# Patient Record
Sex: MALE | Race: WHITE | NOT HISPANIC OR LATINO | Employment: FULL TIME | URBAN - METROPOLITAN AREA
[De-identification: names, ages, dates, MRNs, and addresses within clinical notes are randomized per-mention and may not be internally consistent; named-entity substitution may affect disease eponyms.]

---

## 2021-12-02 ENCOUNTER — TELEPHONE (OUTPATIENT)
Dept: ORTHOPEDIC SURGERY | Facility: CLINIC | Age: 31
End: 2021-12-02

## 2021-12-07 ENCOUNTER — OFFICE VISIT (OUTPATIENT)
Dept: ORTHOPEDIC SURGERY | Facility: CLINIC | Age: 31
End: 2021-12-07

## 2021-12-07 VITALS — BODY MASS INDEX: 26.6 KG/M2 | HEART RATE: 76 BPM | OXYGEN SATURATION: 100 % | WEIGHT: 190 LBS | HEIGHT: 71 IN

## 2021-12-07 DIAGNOSIS — S62.665A CLOSED NONDISPLACED FRACTURE OF DISTAL PHALANX OF LEFT RING FINGER, INITIAL ENCOUNTER: ICD-10-CM

## 2021-12-07 DIAGNOSIS — S62.633A CLOSED DISPLACED FRACTURE OF DISTAL PHALANX OF LEFT MIDDLE FINGER, INITIAL ENCOUNTER: ICD-10-CM

## 2021-12-07 DIAGNOSIS — M79.642 LEFT HAND PAIN: Primary | ICD-10-CM

## 2021-12-07 PROCEDURE — 29130 APPL FINGER SPLINT STATIC: CPT | Performed by: PHYSICIAN ASSISTANT

## 2021-12-07 PROCEDURE — 99203 OFFICE O/P NEW LOW 30 MIN: CPT | Performed by: PHYSICIAN ASSISTANT

## 2021-12-07 NOTE — PROGRESS NOTES
"Chief Complaint  Initial Evaluation of the Left Hand    Subjective          Nicolas Powell presents to [facility] for evaluation of left hand pain.  Patient states he sustained injury while at work 2 weeks ago (11-19-21).  Patient works as a  in [employer].  He states that he had his left second through fourth digits jammed in a garbage bin.  Patient went to [facility] and had imaging obtained showing acute fractures of third and fourth distal phalanges.  Patient sustained lacerations of the second through fourth digits that had sutures.  He had suture removal on 12/05/2021 at urgent care.  He states he was using a splint for his fingers, but the splints were long and causing stiffness of the entire fingers.  Patient states he still has swelling and pain, worse of the third digit.    Objective   No Known Allergies    Vital Signs:   Pulse 76   Ht 180.3 cm (71\")   Wt 86.2 kg (190 lb)   SpO2 100%   BMI 26.50 kg/m²       Physical Exam  Constitutional:       Appearance: Normal appearance. Patient is well-developed and normal weight.   HENT:      Head: Normocephalic.      Right Ear: Hearing and external ear normal.      Left Ear: Hearing and external ear normal.      Nose: Nose normal.   Eyes:      Conjunctiva/sclera: Conjunctivae normal.   Cardiovascular:      Rate and Rhythm: Normal rate.   Pulmonary:      Effort: Pulmonary effort is normal.      Breath sounds: No wheezing or rales.   Abdominal:      Palpations: Abdomen is soft.      Tenderness: There is no abdominal tenderness.   Musculoskeletal:      Cervical back: Normal range of motion.   Skin:     Findings: No rash.   Neurological:      Mental Status: Patient is alert and oriented to person, place, and time.   Psychiatric:         Mood and Affect: Mood and affect normal.         Judgment: Judgment normal.     Ortho Exam  Left hand: Well-healed lacerations along the second and fourth digits.  " Subungual hematoma of the second through fourth digits.  No signs of infection.  No heat, redness or deformity.  Mild swelling along the third through fourth digit.  Tenderness to palpate the DIP of third through fourth digit.  Full range of motion of the digits.  FDS and FDP intact.  Normal thumb opposition and abduction.  Sensation intact, neurovascular tact, radial and ulnar pulse are 2+.    Result Review :   The following data was reviewed by: LINDSAY Henry on 12/07/2021:         Imaging Results (Most Recent)     Procedure Component Value Units Date/Time    XR Hand 2 View Left [[id number]] Resulted: 12/07/21 0951     Updated: 12/07/21 0952    Narrative:      X-Ray Report:  Study: X-rays ordered, taken in the office, and reviewed today  Site: Left hand xray  Indication: Left hand pain  View: AP and Lateral view(s)  Findings: Mildly displaced distal phalanx fracture third digit.    Nondisplaced fracture of the fourth distal phalanx.  Prior studies available for comparison: no       Impression:               Orthopedic Injury Treatment    Date/Time: 12/7/2021 9:24 AM  Performed by: Loree Perez PA  Authorized by: Loree Perez PA   Injury location: finger    Anesthesia:  Local anesthesia used: no    Sedation:  Patient sedated: no    Immobilization: splint  Splint type: dynamic finger  Post-procedure neurovascular assessment: post-procedure neurovascularly intact  Patient tolerance: patient tolerated the procedure well with no immediate complications            Assessment and Plan    Problem List Items Addressed This Visit        Musculoskeletal and Injuries    Closed nondisplaced fracture of distal phalanx of left ring finger    Relevant Orders    Orthopedic Injury Treatment    Closed displaced fracture of distal phalanx of left middle finger    Relevant Orders    Orthopedic Injury Treatment      Other Visit Diagnoses     Left hand pain    -  Primary    Relevant Orders    XR Hand 2 View Left         Patient was placed into DIP splint of the third digit with cooper loop to prevent PIP stiffness that he was experiencing with previous plan.  He is instructed to leave splint on until follow-up visit in 3 weeks at which time we will repeat the films and ensure proper healing.      Follow Up   Return in about 3 weeks (around 12/28/2021).  Patient Instructions   Patient placed in finger splint today with cooper loop.   No heavy lifting, pushing or pulling.     Follow up in 3 weeks. Repeat films.     Patient was given instructions and counseling regarding his condition or for health maintenance advice. Please see specific information pulled into the AVS if appropriate.

## 2021-12-07 NOTE — PATIENT INSTRUCTIONS
Patient placed in finger splint today with cooper loop.   No heavy lifting, pushing or pulling.     Follow up in 3 weeks. Repeat films.

## 2021-12-28 ENCOUNTER — OFFICE VISIT (OUTPATIENT)
Dept: ORTHOPEDIC SURGERY | Facility: CLINIC | Age: 31
End: 2021-12-28

## 2021-12-28 VITALS — BODY MASS INDEX: 26.96 KG/M2 | OXYGEN SATURATION: 98 % | WEIGHT: 192.6 LBS | HEART RATE: 89 BPM | HEIGHT: 71 IN

## 2021-12-28 DIAGNOSIS — S62.665D CLOSED NONDISPLACED FRACTURE OF DISTAL PHALANX OF LEFT RING FINGER WITH ROUTINE HEALING, SUBSEQUENT ENCOUNTER: Primary | ICD-10-CM

## 2021-12-28 PROCEDURE — 99212 OFFICE O/P EST SF 10 MIN: CPT | Performed by: PHYSICIAN ASSISTANT

## 2021-12-28 NOTE — PROGRESS NOTES
"Chief Complaint  Pain of the Left Hand    Subjective          Nicolas Powell presents to [facility] for follow-up of left third and fourth digit fractures.  Patient sustained injury of his third and fourth digit on 11/19/2021 while at work.  Patient works as a  in [employer].  Patient had the third and fourth digits jammed in a garbage bin on this date.  Patient went to [facility] following his injury.  Patient had his third and fourth digit sutured, sutures removed on 12/05/2021.  Patient was placed into aluminum splint with cooper loop at previous visit 3 weeks ago.  He denies any pain, numbness or tingling of the digits.  Patient states he has not been using the aluminum splint the third digit.    Objective   No Known Allergies    Vital Signs:   Pulse 89   Ht 180.3 cm (71\")   Wt 87.4 kg (192 lb 9.6 oz)   SpO2 98%   BMI 26.86 kg/m²       Physical Exam  Constitutional:       Appearance: Normal appearance. Patient is well-developed and normal weight.   HENT:      Head: Normocephalic.      Right Ear: Hearing and external ear normal.      Left Ear: Hearing and external ear normal.      Nose: Nose normal.   Eyes:      Conjunctiva/sclera: Conjunctivae normal.   Cardiovascular:      Rate and Rhythm: Normal rate.   Pulmonary:      Effort: Pulmonary effort is normal.      Breath sounds: No wheezing or rales.   Abdominal:      Palpations: Abdomen is soft.      Tenderness: There is no abdominal tenderness.   Musculoskeletal:      Cervical back: Normal range of motion.   Skin:     Findings: No rash.   Neurological:      Mental Status: Patient is alert and oriented to person, place, and time.   Psychiatric:         Mood and Affect: Mood and affect normal.         Judgment: Judgment normal.     Ortho Exam  Left hand: Well-healed scars across the third and fourth distal phalanx.  Mildly tender to palpate over the fourth distal phalanx.  No swelling " or discoloration.  Subungual hematoma of the third and fourth digits.  FDS and FDP are intact.  Good muscle tone of the wrist flexors and extensors.  Sensation is intact, neurovascular intact, radial and ulnar pulse are 2+.      Result Review :   The following data was reviewed by: LINDSAY Henry on 12/28/2021:         Imaging Results (Most Recent)     Procedure Component Value Units Date/Time    XR Hand 2 View Left [[id number]] Resulted: 12/28/21 0812     Updated: 12/28/21 0813    Narrative:      X-Ray Report:  Study: X-rays ordered, taken in the office, and reviewed today  Site: left hand Xray  Indication: pain  View: AP and Lateral view(s)  Findings: Well healing distal phalanx of the fourth digit. Early healing   of displaced fourth digit distal phalanx fracture.  Prior studies available for comparison: yes                   Assessment and Plan    Problem List Items Addressed This Visit        Musculoskeletal and Injuries    Closed nondisplaced fracture of distal phalanx of left ring finger - Primary    Relevant Orders    XR Hand 2 View Left (Completed)         Discussed fracture healing with patient today in clinic.  Patient is instructed to continue use of the aluminum splint of the DIP of the third digit, I recommended use of jose enrique loop with activity and during work.  Patient expressed verbal understanding of importance of continuing the aluminum splint. We discussed splint removal at follow up, assuming fractures are well-healed.      Follow Up   Return in about 3 weeks (around 1/18/2022).  Patient Instructions   Continue use of stack splint of the third digit.   Jose Enrique loop to be used for comfort and during work.   Recommend no heavy lifting, pushing or pulling.  Follow up in 3 weeks. Repeat films.     Patient was given instructions and counseling regarding his condition or for health maintenance advice. Please see specific information pulled into the AVS if appropriate.

## 2021-12-28 NOTE — PATIENT INSTRUCTIONS
Continue use of stack splint of the third digit.   Jose Enrique loop to be used for comfort and during work.   Recommend no heavy lifting, pushing or pulling.  Follow up in 3 weeks. Repeat films.

## 2022-01-20 ENCOUNTER — OFFICE VISIT (OUTPATIENT)
Dept: ORTHOPEDIC SURGERY | Facility: CLINIC | Age: 32
End: 2022-01-20

## 2022-01-20 VITALS — BODY MASS INDEX: 26.54 KG/M2 | HEART RATE: 78 BPM | WEIGHT: 185.4 LBS | HEIGHT: 70 IN | OXYGEN SATURATION: 98 %

## 2022-01-20 DIAGNOSIS — M79.642 LEFT HAND PAIN: Primary | ICD-10-CM

## 2022-01-20 DIAGNOSIS — S62.665D CLOSED NONDISPLACED FRACTURE OF DISTAL PHALANX OF LEFT RING FINGER WITH ROUTINE HEALING, SUBSEQUENT ENCOUNTER: ICD-10-CM

## 2022-01-20 PROCEDURE — 99213 OFFICE O/P EST LOW 20 MIN: CPT | Performed by: PHYSICIAN ASSISTANT

## 2022-01-20 NOTE — PATIENT INSTRUCTIONS
Continue with aluminum splint for third digit DIP, ensure use while working. Do not remove during activity.     Follow up in 3 weeks. Repeat films.

## 2022-01-20 NOTE — PROGRESS NOTES
"Chief Complaint  Follow-up of the Left Hand    Subjective          Nicolas Powell presents to [facility] for follow up of left third and fourth distal phalanx fractures sustained on 12/05/21  When he jammed his hand in a garbage bin at work. He states he has not been as compliant with splint provided at his initial visit. He states he has returned to all activity without pain or issue. He has no other new complaints at this time.     Objective   No Known Allergies    Vital Signs:   Pulse 78   Ht 177.8 cm (70\")   Wt 84.1 kg (185 lb 6.4 oz)   SpO2 98%   BMI 26.60 kg/m²       Physical Exam  Constitutional:       Appearance: Normal appearance. Patient is well-developed and normal weight.   HENT:      Head: Normocephalic.      Right Ear: Hearing and external ear normal.      Left Ear: Hearing and external ear normal.      Nose: Nose normal.   Eyes:      Conjunctiva/sclera: Conjunctivae normal.   Cardiovascular:      Rate and Rhythm: Normal rate.   Pulmonary:      Effort: Pulmonary effort is normal.      Breath sounds: No wheezing or rales.   Abdominal:      Palpations: Abdomen is soft.      Tenderness: There is no abdominal tenderness.   Musculoskeletal:      Cervical back: Normal range of motion.   Skin:     Findings: No rash.   Neurological:      Mental Status: Patient is alert and oriented to person, place, and time.   Psychiatric:         Mood and Affect: Mood and affect normal.         Judgment: Judgment normal.     Ortho Exam  LEFT HAND: Full DIP/PIP of the fourth digit, slightly decreased DIP flexion of the third digit, full PIP flexion of third digit. Swelling along the third distal phalanx. Subungual hematoma of third and fourth digits. Scar well healed. Mildly tender third distal phalanx. Sensation is intact. Neurovascular intact. Radial and ulnar pulse are 2+.     Result Review :            Imaging Results (Most Recent)     Procedure Component Value Units Date/Time    " XR Hand 3+ View Left [[id number]] Resulted: 01/20/22 0923     Updated: 01/20/22 0924    Narrative:      X-Ray Report:  Study: X-rays ordered, taken in the office, and reviewed today  Site: left hand Xray  Indication: left hand pain   View: AP and Lateral view(s)  Findings: well healed fourth distal phalanx fracture. No further   displacement of third distal phalanx fracture.   Prior studies available for comparison: yes                   Assessment and Plan    Problem List Items Addressed This Visit        Musculoskeletal and Injuries    Closed nondisplaced fracture of distal phalanx of left ring finger      Other Visit Diagnoses     Left hand pain    -  Primary    Relevant Orders    XR Hand 3+ View Left (Completed)        Patient advised to continue aluminum splint for the third distal phalanx during work as it continues to heal and remodel for protection. He will follow up in 3 weeks to repeat imaging.      Follow Up   Return in about 3 weeks (around 2/10/2022).  Patient Instructions   Continue with aluminum splint for third digit DIP, ensure use while working. Do not remove during activity.     Follow up in 3 weeks. Repeat films.       Patient was given instructions and counseling regarding his condition or for health maintenance advice. Please see specific information pulled into the AVS if appropriate.

## 2022-02-17 ENCOUNTER — OFFICE VISIT (OUTPATIENT)
Dept: ORTHOPEDIC SURGERY | Facility: CLINIC | Age: 32
End: 2022-02-17

## 2022-02-17 VITALS — BODY MASS INDEX: 27 KG/M2 | OXYGEN SATURATION: 97 % | WEIGHT: 188.6 LBS | HEIGHT: 70 IN | HEART RATE: 75 BPM

## 2022-02-17 DIAGNOSIS — M79.642 LEFT HAND PAIN: Primary | ICD-10-CM

## 2022-02-17 DIAGNOSIS — S62.665D CLOSED NONDISPLACED FRACTURE OF DISTAL PHALANX OF LEFT RING FINGER WITH ROUTINE HEALING, SUBSEQUENT ENCOUNTER: ICD-10-CM

## 2022-02-17 PROCEDURE — 99213 OFFICE O/P EST LOW 20 MIN: CPT | Performed by: PHYSICIAN ASSISTANT

## 2022-02-17 NOTE — PROGRESS NOTES
"Chief Complaint  Pain and Follow-up of the Left Hand    Subjective          Nicolas Powell presents to [facility] for follow up of left third distal phalanx fractures sustained at the end of Nov. 2021. Patient states he feels he is has reinjured his hand while working. 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 He states he wears splint while not working frequently to avoid further injury to the hand. He states pain  Has been minimal. He denies numbness or tingling. He has no other new complaints.      Objective   No Known Allergies    Vital Signs:   Pulse 75   Ht 177.8 cm (70\")   Wt 85.5 kg (188 lb 9.6 oz)   SpO2 97%   BMI 27.06 kg/m²       Physical Exam  Constitutional:       Appearance: Normal appearance. Patient is well-developed and normal weight.   HENT:      Head: Normocephalic.      Right Ear: Hearing and external ear normal.      Left Ear: Hearing and external ear normal.      Nose: Nose normal.   Eyes:      Conjunctiva/sclera: Conjunctivae normal.   Cardiovascular:      Rate and Rhythm: Normal rate.   Pulmonary:      Effort: Pulmonary effort is normal.      Breath sounds: No wheezing or rales.   Abdominal:      Palpations: Abdomen is soft.      Tenderness: There is no abdominal tenderness.   Musculoskeletal:      Cervical back: Normal range of motion.   Skin:     Findings: No rash.   Neurological:      Mental Status: Patient is alert and oriented to person, place, and time.   Psychiatric:         Mood and Affect: Mood and affect normal.         Judgment: Judgment normal.     Ortho Exam  LEFT HAND: tender to palpate distal phalanx of third digit, mild swelling. No discoloration. No signs of infection. Well healed scars. near full DIP flexion of the third digit, full PIP flexion of third digit. Able to make slight fist. Good muscle tone of wrist flexors and " extensors. Sensation intact. Neurovascular intact. Radial and ulnar pulse are 2+.     Result Review :            Imaging Results (Most Recent)     Procedure Component Value Units Date/Time    XR Hand 3+ View Left [[id number]] Resulted: 02/17/22 0840     Updated: 02/17/22 0841    Narrative:      X-Ray Report:  Study: X-rays ordered, taken in the office, and reviewed today  Site: left hand Xray  Indication: left hand pain   View: AP and Lateral view(s)  Findings: third distal phalanx fracture with evidence of bony healing as   compared to comparison view 01/20/22  Prior studies available for comparison: yes                  Assessment and Plan    Problem List Items Addressed This Visit        Musculoskeletal and Injuries    Closed nondisplaced fracture of distal phalanx of left ring finger      Other Visit Diagnoses     Left hand pain    -  Primary    Relevant Orders    XR Hand 3+ View Left (Completed)          Follow Up   Return in about 4 weeks (around 3/17/2022).  Patient Instructions   Recommend use of splint with any activity. Avoid heavy lifting, pushing and pulling with the left hand. Patient may work on ROM exercises of all digits of the left hand.     Follow up in 3 weeks to 1 month. Repeat X-ray.     Patient was given instructions and counseling regarding his condition or for health maintenance advice. Please see specific information pulled into the AVS if appropriate.

## 2022-02-17 NOTE — PATIENT INSTRUCTIONS
Recommend use of splint with any activity. Avoid heavy lifting, pushing and pulling with the left hand. Patient may work on ROM exercises of all digits of the left hand.     Follow up in 3 weeks to 1 month. Repeat X-ray.

## 2022-03-17 ENCOUNTER — OFFICE VISIT (OUTPATIENT)
Dept: ORTHOPEDIC SURGERY | Facility: CLINIC | Age: 32
End: 2022-03-17

## 2022-03-17 VITALS — HEIGHT: 70 IN | WEIGHT: 194 LBS | HEART RATE: 69 BPM | BODY MASS INDEX: 27.77 KG/M2 | OXYGEN SATURATION: 97 %

## 2022-03-17 DIAGNOSIS — S62.633A CLOSED DISPLACED FRACTURE OF DISTAL PHALANX OF LEFT MIDDLE FINGER, INITIAL ENCOUNTER: ICD-10-CM

## 2022-03-17 DIAGNOSIS — M79.642 LEFT HAND PAIN: Primary | ICD-10-CM

## 2022-03-17 DIAGNOSIS — S62.665D CLOSED NONDISPLACED FRACTURE OF DISTAL PHALANX OF LEFT RING FINGER WITH ROUTINE HEALING, SUBSEQUENT ENCOUNTER: ICD-10-CM

## 2022-03-17 PROCEDURE — 99213 OFFICE O/P EST LOW 20 MIN: CPT | Performed by: PHYSICIAN ASSISTANT

## 2022-03-17 NOTE — PROGRESS NOTES
"Chief Complaint  Pain and Follow-up of the Left Hand    Subjective          Nicolas Powell presents to [facility] for follow-up of left hand injury sustained end of November 2021.  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  Patient sustained injury to the third and fourth distal phalanx when his fingers got stuck in a garbage bin lid.  Patient has been using AlumaFoam splint since time of his injury.  He denies having any pain of his third or fourth digit.  He denies any numbness or tingling.  He has no other new complaints today.    Objective   No Known Allergies    Vital Signs:   Pulse 69   Ht 177.8 cm (70\")   Wt 88 kg (194 lb)   SpO2 97%   BMI 27.84 kg/m²       Physical Exam  Constitutional:       Appearance: Normal appearance. Patient is well-developed and normal weight.   HENT:      Head: Normocephalic.      Right Ear: Hearing and external ear normal.      Left Ear: Hearing and external ear normal.      Nose: Nose normal.   Eyes:      Conjunctiva/sclera: Conjunctivae normal.   Cardiovascular:      Rate and Rhythm: Normal rate.   Pulmonary:      Effort: Pulmonary effort is normal.      Breath sounds: No wheezing or rales.   Abdominal:      Palpations: Abdomen is soft.      Tenderness: There is no abdominal tenderness.   Musculoskeletal:      Cervical back: Normal range of motion.   Skin:     Findings: No rash.   Neurological:      Mental Status: Patient is alert and oriented to person, place, and time.   Psychiatric:         Mood and Affect: Mood and affect normal.         Judgment: Judgment normal.     Ortho Exam  Left hand: Nontender to palpate along the third and fourth distal phalanx.  No swelling or discoloration.  Well-healed scars along the third and fourth digits.  FDS/FDP intact.  Full PIP and DIP flexion and extension of all digits, full thumb opposition.  Able to make a tight fist.  Full " wrist flexion and extension.  Full pronation and supination.  Sensation is intact.  Neurovascular intact.  Radial and ulnar pulse are 2+.    Result Review :   The following data was reviewed by: LINDSAY Henry on 03/17/2022:         Imaging Results (Most Recent)     Procedure Component Value Units Date/Time    XR Hand 3+ View Left [[id number]] Resulted: 03/17/22 0921     Updated: 03/17/22 0922    Narrative:      X-Ray Report:  Study: X-rays ordered, taken in the office, and reviewed today  Site: Left hand xray  Indication: Left hand pain  View: AP and Lateral view(s)  Findings: Third distal phalanx fracture mild volar angulation and evidence   of bony healing  Prior studies available for comparison:yes                  Assessment and Plan    Problem List Items Addressed This Visit        Musculoskeletal and Injuries    Closed nondisplaced fracture of distal phalanx of left ring finger    Closed displaced fracture of distal phalanx of left middle finger      Other Visit Diagnoses     Left hand pain    -  Primary    Relevant Orders    XR Hand 3+ View Left (Completed)        Discussed with patient treatment options regarding mild malalignment of his third digit.  Patient deferred referral to hand surgery, as he states he is happy with where his fingers are and does not mind any deformity.  He states he has full function of his hand and this is enough for him.  Advised patient he may call after consideration, should he request referral.       Follow Up   Return if symptoms worsen or fail to improve.  There are no Patient Instructions on file for this visit.  Patient was given instructions and counseling regarding his condition or for health maintenance advice. Please see specific information pulled into the AVS if appropriate.